# Patient Record
Sex: MALE | Race: WHITE | NOT HISPANIC OR LATINO | Employment: FULL TIME | URBAN - METROPOLITAN AREA
[De-identification: names, ages, dates, MRNs, and addresses within clinical notes are randomized per-mention and may not be internally consistent; named-entity substitution may affect disease eponyms.]

---

## 2019-07-08 ENCOUNTER — OFFICE VISIT (OUTPATIENT)
Dept: FAMILY MEDICINE | Facility: CLINIC | Age: 39
End: 2019-07-08
Payer: COMMERCIAL

## 2019-07-08 VITALS
TEMPERATURE: 98 F | HEIGHT: 71 IN | DIASTOLIC BLOOD PRESSURE: 72 MMHG | RESPIRATION RATE: 16 BRPM | BODY MASS INDEX: 25.81 KG/M2 | OXYGEN SATURATION: 98 % | SYSTOLIC BLOOD PRESSURE: 114 MMHG | WEIGHT: 184.4 LBS | HEART RATE: 62 BPM

## 2019-07-08 DIAGNOSIS — Z00.00 ROUTINE GENERAL MEDICAL EXAMINATION AT A HEALTH CARE FACILITY: Primary | ICD-10-CM

## 2019-07-08 DIAGNOSIS — J45.20 MILD INTERMITTENT ASTHMA WITHOUT COMPLICATION: ICD-10-CM

## 2019-07-08 DIAGNOSIS — D17.30 LIPOMA OF SKIN AND SUBCUTANEOUS TISSUE: ICD-10-CM

## 2019-07-08 DIAGNOSIS — L72.3 SEBACEOUS CYST: ICD-10-CM

## 2019-07-08 LAB
CHOLEST SERPL-MCNC: 198 MG/DL
HDLC SERPL-MCNC: 51 MG/DL
LDLC SERPL CALC-MCNC: 109 MG/DL
NONHDLC SERPL-MCNC: 147 MG/DL
TRIGL SERPL-MCNC: 191 MG/DL

## 2019-07-08 PROCEDURE — 80061 LIPID PANEL: CPT | Performed by: FAMILY MEDICINE

## 2019-07-08 PROCEDURE — 36415 COLL VENOUS BLD VENIPUNCTURE: CPT | Performed by: FAMILY MEDICINE

## 2019-07-08 PROCEDURE — 87389 HIV-1 AG W/HIV-1&-2 AB AG IA: CPT | Performed by: FAMILY MEDICINE

## 2019-07-08 PROCEDURE — 99395 PREV VISIT EST AGE 18-39: CPT | Performed by: FAMILY MEDICINE

## 2019-07-08 RX ORDER — ALBUTEROL SULFATE 90 UG/1
2 AEROSOL, METERED RESPIRATORY (INHALATION) EVERY 4 HOURS PRN
Qty: 1 INHALER | Refills: 11 | Status: SHIPPED | OUTPATIENT
Start: 2019-07-08

## 2019-07-08 ASSESSMENT — ENCOUNTER SYMPTOMS
PALPITATIONS: 0
NERVOUS/ANXIOUS: 0
COUGH: 0
SORE THROAT: 0
NAUSEA: 0
DIARRHEA: 0
FREQUENCY: 0
WEAKNESS: 0
JOINT SWELLING: 0
SHORTNESS OF BREATH: 0
HEMATOCHEZIA: 0
DIZZINESS: 0
DYSURIA: 0
CONSTIPATION: 0
HEARTBURN: 0
MYALGIAS: 0
CHILLS: 0
ABDOMINAL PAIN: 0
HEMATURIA: 0
HEADACHES: 0
PARESTHESIAS: 0
ARTHRALGIAS: 0
FEVER: 0
EYE PAIN: 0

## 2019-07-08 ASSESSMENT — MIFFLIN-ST. JEOR: SCORE: 1775.62

## 2019-07-08 ASSESSMENT — PAIN SCALES - GENERAL: PAINLEVEL: NO PAIN (0)

## 2019-07-08 NOTE — PATIENT INSTRUCTIONS
Preventive Health Recommendations  Male Ages 26 - 39    Yearly exam:             See your health care provider every year in order to  o   Review health changes.   o   Discuss preventive care.    o   Review your medicines if your doctor has prescribed any.    You should be tested each year for STDs (sexually transmitted diseases), if you re at risk.     After age 35, talk to your provider about cholesterol testing. If you are at risk for heart disease, have your cholesterol tested at least every 5 years.     If you are at risk for diabetes, you should have a diabetes test (fasting glucose).  Shots: Get a flu shot each year. Get a tetanus shot every 10 years.     Nutrition:    Eat at least 5 servings of fruits and vegetables daily.     Eat whole-grain bread, whole-wheat pasta and brown rice instead of white grains and rice.     Get adequate Calcium and Vitamin D.     Lifestyle    Exercise for at least 150 minutes a week (30 minutes a day, 5 days a week). This will help you control your weight and prevent disease.     Limit alcohol to one drink per day.     No smoking.     Wear sunscreen to prevent skin cancer.     See your dentist every six months for an exam and cleaning.      ASSESSMENT/PLAN:                                                    Lifestyle recommendations:regular exercise, at least 150 minutes per week (average 30 minutes 5 times a week)  weight loss recommended (ideal BMI-body mass index is <25)  The following exams/tests were recommended and discussed for health maintenance:  Colon cancer screening beginning at age 50 if at normal risk  Prostate cancer screening is optional starting at age 50 if normal risk, age 40 or 45 for high risk men (strong family history or blacks.)  Screening can include digital rectal exam and PSA blood test.  Cholesterol screening to evaluate cardiovascular risk.  Cholesterol screening every 4-5 years if normal values and low risk     (Z00.00) Routine general medical  examination at a health care facility  (primary encounter diagnosis)  Comment:   Plan: HIV Antigen Antibody Combo, Lipid panel reflex         to direct LDL Non-fasting        Non-fasting blood tests today.     (J45.20) Mild intermittent asthma without complication  Comment: stble with infrequent albuteral inhaler use.   Plan: albuterol (PROAIR HFA) 108 (90 Base) MCG/ACT         inhaler        Refills if needed.   Albuteral inhaler can be used taking 2 inhalations every 4 hours as needed for coughing, wheezing or shortness of breath.  OK to use 15-30 minutes before exercise or activity that could trigger the asthma.     (L72.3) Sebaceous cyst  Comment: lump on scalp is benign-not cancer.  Not worrisome  Plan: It could be removed with clinic office surgical procedure.    Schedule an appointment with me if you would like it removed.     (D17.30) Lipoma of skin and subcutaneous tissue  Comment: fatty lump on thigh is also not cancer or worrisome.    Plan: I recommend leaving it alone unless causing problems.

## 2019-07-08 NOTE — PROGRESS NOTES
SUBJECTIVE:   CC: Temo Sawant is an 39 year old male who presents for preventative health visit.   Chief Complaint   Patient presents with     Physical      Issues:  Lump on top of head.  Onset of symptoms: a couple years.  Unchanged in size.  Irritated from hard hat.   Lump left thigh a couple years.    Moles moustache area.   Skin tag posterior neck.     Would like albuteral inhaler.  Has used a couple times in the past year.  Triggering factors: dust, running.      Healthy Habits:     Getting at least 3 servings of Calcium per day:  Yes    Bi-annual eye exam:  Yes    Dental care twice a year:  Yes    Sleep apnea or symptoms of sleep apnea:  None    Diet:  Regular (no restrictions)    Frequency of exercise:  None    Taking medications regularly:  Yes    Medication side effects:  Other    PHQ-2 Total Score: 0    Additional concerns today:  No  Exercise:none    Today's PHQ-2 Score:   PHQ-2 (  [employer]) 2019   Q1: Little interest or pleasure in doing things 0   Q2: Feeling down, depressed or hopeless 0   PHQ-2 Score 0   Q1: Little interest or pleasure in doing things Not at all   Q2: Feeling down, depressed or hopeless Not at all   PHQ-2 Score 0     Abuse: Current or Past(Physical, Sexual or Emotional)- NO  Do you feel safe in your environment? YES    Social History     Tobacco Use     Smoking status: Former Smoker     Packs/day: 1.00     Years: 2.00     Pack years: 2.00     Last attempt to quit: 2000     Years since quittin.5     Smokeless tobacco: Former User   Substance Use Topics     Alcohol use: Yes     If you drink alcohol do you typically have >3 drinks per day or >7 drinks per week? No    Alcohol Use 2019   Prescreen: >3 drinks/day or >7 drinks/week? No     Last PSA: No results found for: PSA    Patient Active Problem List    Diagnosis Date Noted     Intermittent asthma 2014     Priority: Medium     CARDIOVASCULAR SCREENING; LDL GOAL LESS THAN 160 10/31/2010     Priority: Medium     "  Family history:  CV disease: no  Prostate cancer: no  Colon cancer: no    Multivitamin or Vit D use: no    Vaccines:current     Past Colon cancer screening:na     Review of Systems   Constitutional: Negative for chills and fever.   HENT: Negative for congestion, ear pain, hearing loss and sore throat.    Eyes: Negative for pain and visual disturbance.   Respiratory: Negative for cough and shortness of breath.    Cardiovascular: Negative for chest pain, palpitations and peripheral edema.   Gastrointestinal: Negative for abdominal pain, constipation, diarrhea, heartburn, hematochezia and nausea.   Genitourinary: Negative for discharge, dysuria, frequency, genital sores, hematuria, impotence and urgency.   Musculoskeletal: Negative for arthralgias, joint swelling and myalgias.   Skin: Negative for rash.   Neurological: Negative for dizziness, weakness, headaches and paresthesias.   Psychiatric/Behavioral: Negative for mood changes. The patient is not nervous/anxious.    occasional headaches in the summer.     Occupation: installs elevators.     OBJECTIVE:                                                    OBJECTIVE:Blood pressure 114/72, pulse 62, temperature 98  F (36.7  C), temperature source Tympanic, resp. rate 16, height 1.791 m (5' 10.5\"), weight 83.6 kg (184 lb 6.4 oz), SpO2 98 %. BMI=Body mass index is 26.08 kg/m .  GENERAL APPEARANCE ADULT: Alert, no acute distress  EYES: PERRL, EOM normal, conjunctiva and lids normal  HENT: Ears and TMs normal, oral mucosa and posterior oropharynx normal  NECK: No adenopathy,masses or thyromegaly  RESP: lungs clear to auscultation   CV: normal rate, regular rhythm, no murmur or gallop  ABDOMEN: soft, no organomegaly, masses or tenderness   (male): declined  MS: extremities normal, no peripheral edema  SKIN: 1cm lump top of head.   2cm lipoma anterior right thigh.     ASSESSMENT/PLAN:                                                    Lifestyle recommendations:regular " exercise, at least 150 minutes per week (average 30 minutes 5 times a week)  weight loss recommended (ideal BMI-body mass index is <25)  The following exams/tests were recommended and discussed for health maintenance:  Colon cancer screening beginning at age 50 if at normal risk  Prostate cancer screening is optional starting at age 50 if normal risk, age 40 or 45 for high risk men (strong family history or blacks.)  Screening can include digital rectal exam and PSA blood test.  Cholesterol screening to evaluate cardiovascular risk.  Cholesterol screening every 4-5 years if normal values and low risk     (Z00.00) Routine general medical examination at a health care facility  (primary encounter diagnosis)  Comment:   Plan: HIV Antigen Antibody Combo, Lipid panel reflex         to direct LDL Non-fasting        Non-fasting blood tests today.     (J45.20) Mild intermittent asthma without complication  Comment: stble with infrequent albuteral inhaler use.   Plan: albuterol (PROAIR HFA) 108 (90 Base) MCG/ACT         inhaler        Refills if needed.   Albuteral inhaler can be used taking 2 inhalations every 4 hours as needed for coughing, wheezing or shortness of breath.  OK to use 15-30 minutes before exercise or activity that could trigger the asthma.     (L72.3) Sebaceous cyst  Comment: lump on scalp is benign-not cancer.  Not worrisome  Plan: It could be removed with clinic office surgical procedure.    Schedule an appointment with me if you would like it removed.     (D17.30) Lipoma of skin and subcutaneous tissue  Comment: fatty lump on thigh is also not cancer or worrisome.  Plan: I recommend leaving it alone unless causing problems.      COUNSELING:   Reviewed preventive health counseling, as reflected in patient instructions  Special attention given to:        HIV screeninx in teen years, 1x in adult years, and at intervals if high risk    Estimated body mass index is 26.08 kg/m  as calculated from the  "following:    Height as of this encounter: 1.791 m (5' 10.5\").    Weight as of this encounter: 83.6 kg (184 lb 6.4 oz).     Weight management plan: Discussed healthy diet and exercise guidelines     reports that he quit smoking about 19 years ago. He has a 2.00 pack-year smoking history. He has quit using smokeless tobacco.      Counseling Resources:  ATP IV Guidelines  Pooled Cohorts Equation Calculator  FRAX Risk Assessment  ICSI Preventive Guidelines  Dietary Guidelines for Americans, 2010  USDA's MyPlate  ASA Prophylaxis  Lung CA Screening    Laz Stevenson MD  [facility]  "

## 2019-07-08 NOTE — LETTER
My Asthma Action Plan  Name: Temo HOLMAN Bzdok   YOB: 1980  Date: 7/8/2019   My doctor: Laz Stevenson MD   My clinic: [facility]        My Control Medicine: None  My Rescue Medicine: Albuterol (Proair/Ventolin/Proventil) inhaler 2 ouffs every 4-6 hours as needed forcough and wheeze   My Asthma Severity: intermittent  Avoid your asthma triggers: dust mites and wool               GREEN ZONE   Good Control    I feel good    No cough or wheeze    Can work, sleep and play without asthma symptoms       Take your asthma control medicine every day.     1. If exercise triggers your asthma, take your rescue medication    15 minutes before exercise or sports, and    During exercise if you have asthma symptoms  2. Spacer to use with inhaler: If you have a spacer, make sure to use it with your inhaler             YELLOW ZONE Getting Worse  I have ANY of these:    I do not feel good    Cough or wheeze    Chest feels tight    Wake up at night   1. Keep taking your Green Zone medications  2. Start taking your rescue medicine:    every 20 minutes for up to 1 hour. Then every 4 hours for 24-48 hours.  3. If you stay in the Yellow Zone for more than 12-24 hours, contact your doctor.  4. If you do not return to the Green Zone in 12-24 hours or you get worse, start taking your oral steroid medicine if prescribed by your provider.           RED ZONE Medical Alert - Get Help  I have ANY of these:    I feel awful    Medicine is not helping    Breathing getting harder    Trouble walking or talking    Nose opens wide to breathe       1. Take your rescue medicine NOW  2. If your provider has prescribed an oral steroid medicine, start taking it NOW  3. Call your doctor NOW  4. If you are still in the Red Zone after 20 minutes and you have not reached your doctor:    Take your rescue medicine again and    Call 911 or go to the emergency room right away    See your regular doctor within 2 weeks of an Emergency  Room or Urgent Care visit for follow-up treatment.          Annual Reminders:  Meet with Asthma Educator,  Flu Shot in the Fall, consider Pneumonia Vaccination for patients with asthma (aged 19 and older).    Pharmacy:    [id number] [place] [facility] - [place], [place] - [address]  [facility] - [place], [place] - [address]  [id number] [place] [facility] - [place], [place] - [address]   [id number] - [place], [place] - [address]                      Asthma Triggers  How To Control Things That Make Your Asthma Worse    Triggers are things that make your asthma worse.  Look at the list below to help you find your triggers and what you can do about them.  You can help prevent asthma flare-ups by staying away from your triggers.      Trigger                                                          What you can do   Cigarette Smoke  Tobacco smoke can make asthma worse. Do not allow smoking in your home, car or around you.  Be sure no one smokes at a child s day care or school.  If you smoke, ask your health care provider for ways to help you quit.  Ask family members to quit too.  Ask your health care provider for a referral to Quit Plan to help you quit smoking, or call 2-345-401-PLAN.     Colds, Flu, Bronchitis  These are common triggers of asthma. Wash your hands often.  Don t touch your eyes, nose or mouth.  Get a flu shot every year.     Dust Mites  These are tiny bugs that live in cloth or carpet. They are too small to see. Wash sheets and blankets in hot water every week.   Encase pillows and mattress in dust mite proof covers.  Avoid having carpet if you can. If you have carpet, vacuum weekly.   Use a dust mask and HEPA vacuum.   Pollen and Outdoor Mold  Some people are allergic to trees, grass, or weed pollen, or molds. Try to keep your windows closed.  Limit time out doors when pollen count is high.   Ask you health care provider about taking medicine during allergy season.     Animal  Dander  Some people are allergic to skin flakes, urine or saliva from pets with fur or feathers. Keep pets with fur or feathers out of your home.    If you can t keep the pet outdoors, then keep the pet out of your bedroom.  Keep the bedroom door closed.  Keep pets off cloth furniture and away from stuffed toys.     Mice, Rats, and Cockroaches  Some people are allergic to the waste from these pests.   Cover food and garbage.  Clean up spills and food crumbs.  Store grease in the refrigerator.   Keep food out of the bedroom.   Indoor Mold  This can be a trigger if your home has high moisture. Fix leaking faucets, pipes, or other sources of water.   Clean moldy surfaces.  Dehumidify basement if it is damp and smelly.   Smoke, Strong Odors, and Sprays  These can reduce air quality. Stay away from strong odors and sprays, such as perfume, powder, hair spray, paints, smoke incense, paint, cleaning products, candles and new carpet.   Exercise or Sports  Some people with asthma have this trigger. Be active!  Ask your doctor about taking medicine before sports or exercise to prevent symptoms.    Warm up for 5-10 minutes before and after sports or exercise.     Other Triggers of Asthma  Cold air:  Cover your nose and mouth with a scarf.  Sometimes laughing or crying can be a trigger.  Some medicines and food can trigger asthma.

## 2019-07-08 NOTE — NURSING NOTE
"Chief Complaint   Patient presents with     Physical       Initial /72 (BP Location: Right arm, Patient Position: Chair, Cuff Size: Adult Large)   Pulse 62   Temp 98  F (36.7  C) (Tympanic)   Resp 16   Ht 1.791 m (5' 10.5\")   Wt 83.6 kg (184 lb 6.4 oz)   SpO2 98%   BMI 26.08 kg/m   Estimated body mass index is 26.08 kg/m  as calculated from the following:    Height as of this encounter: 1.791 m (5' 10.5\").    Weight as of this encounter: 83.6 kg (184 lb 6.4 oz).    Patient presents to the clinic using No DME    Health Maintenance that is potentially due pending provider review:  ACT    Possibly completing today per provider review.    Is there anyone who you would like to be able to receive your results? Yes- wife Ruby  If yes have patient fill out YANA  Micki Quezada CMA    "

## 2019-07-09 LAB — HIV 1+2 AB+HIV1 P24 AG SERPL QL IA: NONREACTIVE

## 2019-07-09 ASSESSMENT — ASTHMA QUESTIONNAIRES: ACT_TOTALSCORE: 25

## 2019-07-10 NOTE — RESULT ENCOUNTER NOTE
Please call.  HIV test for AIDS virus is negative..   LDL and triglycerides are elevated.   PLAN: Weight loss, regular exercise with goal of 30 minutes most days of the week and eating a prudent diet (lots of fruits and vegetables, limiting unhealthy fats and excessive calories) can help improve both.

## 2019-11-27 ENCOUNTER — TELEPHONE (OUTPATIENT)
Dept: FAMILY MEDICINE | Facility: CLINIC | Age: 39
End: 2019-11-27

## 2019-11-28 NOTE — TELEPHONE ENCOUNTER
Reason for Call:  Other appointment    Detailed comments: Wife of patient called to schedule a removal of cysts. Patient was seen on 7/8/19 and it was discussed that they could schedule the removal with Dr. Stevenson.     I have scheduled the appointment for 40 minutes, as I was unsure how much time it would take and wife of patient also wanted to be sure that it is enough time.    Phone Number Patient can be reached at: [phone]    Best Time: Any time.    Can we leave a detailed message on this number? YES    Call taken on 11/27/2019 at 7:18 PM by Chinmay Briscoe

## 2019-11-29 NOTE — TELEPHONE ENCOUNTER
Please call.  A 40 minute appointment should be OK for the two lumps on head and thigh.   BOO ARITA MD

## 2019-12-23 ENCOUNTER — OFFICE VISIT (OUTPATIENT)
Dept: FAMILY MEDICINE | Facility: CLINIC | Age: 39
End: 2019-12-23
Payer: COMMERCIAL

## 2019-12-23 VITALS
RESPIRATION RATE: 16 BRPM | DIASTOLIC BLOOD PRESSURE: 72 MMHG | TEMPERATURE: 97.5 F | SYSTOLIC BLOOD PRESSURE: 120 MMHG | WEIGHT: 191 LBS | HEIGHT: 71 IN | BODY MASS INDEX: 26.74 KG/M2 | HEART RATE: 62 BPM

## 2019-12-23 DIAGNOSIS — D17.30 LIPOMA OF SKIN AND SUBCUTANEOUS TISSUE: ICD-10-CM

## 2019-12-23 DIAGNOSIS — L72.3 SEBACEOUS CYST: Primary | ICD-10-CM

## 2019-12-23 PROCEDURE — 11422 EXC H-F-NK-SP B9+MARG 1.1-2: CPT | Performed by: FAMILY MEDICINE

## 2019-12-23 PROCEDURE — 99212 OFFICE O/P EST SF 10 MIN: CPT | Mod: 25 | Performed by: FAMILY MEDICINE

## 2019-12-23 ASSESSMENT — MIFFLIN-ST. JEOR: SCORE: 1805.56

## 2019-12-23 NOTE — NURSING NOTE
"No chief complaint on file.      Initial /72   Pulse 62   Temp 97.5  F (36.4  C) (Tympanic)   Resp 16   Ht 1.791 m (5' 10.5\")   Wt 86.6 kg (191 lb)   BMI 27.02 kg/m   Estimated body mass index is 27.02 kg/m  as calculated from the following:    Height as of this encounter: 1.791 m (5' 10.5\").    Weight as of this encounter: 86.6 kg (191 lb).    Patient presents to the clinic using     Health Maintenance that is potentially due pending provider review:      ACT done and declines flu shot    Is there anyone who you would like to be able to receive your results?   If yes have patient fill out YANA      "

## 2019-12-23 NOTE — PROGRESS NOTES
"SUBJECTIVE: Temo Sawant is a 39 year old male  No chief complaint on file.     Lump on head and upper left thigh      Duration: years for head and thigh    Description (location/character/radiation): raised area    Intensity:  Pressure at times for head lump    Accompanying signs and symptoms: none    History (similar episodes/previous evaluation): yes with head    Precipitating or alleviating factors: None    Therapies tried and outcome: removed from head in the past.     REcent physical exam 7/8/2019  He has lump on scalp and left thigh he wanted removed.    He has had sebaceous cyst on scalp in the past.     Patient Active Problem List   Diagnosis     CARDIOVASCULAR SCREENING; LDL GOAL LESS THAN 160     Intermittent asthma      OBJECTIVE:Blood pressure 120/72, pulse 62, temperature 97.5  F (36.4  C), temperature source Tympanic, resp. rate 16, height 1.791 m (5' 10.5\"), weight 86.6 kg (191 lb). BMI=Body mass index is 27.02 kg/m .  GENERAL APPEARANCE ADULT: Alert, no acute distress  SKIN: HE has a 12mm lump top of scalp which looks like sebaceous cyst.   1cm lump left anterior thigh feels like lipoma.     ASSESSMENT:   (L72.3) Sebaceous cyst  (primary encounter diagnosis)  Comment:   Plan: EXC BENIGN SKIN LESION SCLP/NCK/HNDS/FEET/GEN         1.1-2.0 CM        Informed consent was obtained.    Skin cleansing with povodine iodine solution     1% Lidocaine with epinephrine for local anesthetic.     With sterile technique, cyst puncture and removal was performed.   Wound closure: 1, 4-0 nylon sutures inserted.   Dressing not needed    Wound care instructions:    OK for showers and shampoo hair.    Recheck with any signs of wound infection or problems like redness, tenderness or a lot of swelling.  The procedure was well tolerated without complications.  Follow up: No pathology specimen is sent today. , Suture(s) may be removed at home in 7 days if wound is OK and you feel comfortable removing them.   Or, can see " clinic RN for suture removal    (D17.30) Lipoma of skin and subcutaneous tissue  Comment: This is a small benign appearing lipoma.  I recommended leaving it alone.   Plan: It could be removed if enlarging or causing symptoms in the future.

## 2019-12-24 ASSESSMENT — ASTHMA QUESTIONNAIRES: ACT_TOTALSCORE: 25

## 2023-05-02 ENCOUNTER — OFFICE VISIT (OUTPATIENT)
Dept: FAMILY MEDICINE | Facility: CLINIC | Age: 43
End: 2023-05-02
Payer: COMMERCIAL

## 2023-05-02 VITALS
HEART RATE: 61 BPM | WEIGHT: 190 LBS | DIASTOLIC BLOOD PRESSURE: 70 MMHG | RESPIRATION RATE: 18 BRPM | OXYGEN SATURATION: 97 % | HEIGHT: 71 IN | BODY MASS INDEX: 26.6 KG/M2 | TEMPERATURE: 97.7 F | SYSTOLIC BLOOD PRESSURE: 116 MMHG

## 2023-05-02 DIAGNOSIS — D22.9 ATYPICAL NEVI: ICD-10-CM

## 2023-05-02 DIAGNOSIS — L72.9 SCALP CYST: Primary | ICD-10-CM

## 2023-05-02 PROCEDURE — 99203 OFFICE O/P NEW LOW 30 MIN: CPT | Performed by: FAMILY MEDICINE

## 2023-05-02 ASSESSMENT — ASTHMA QUESTIONNAIRES
QUESTION_4 LAST FOUR WEEKS HOW OFTEN HAVE YOU USED YOUR RESCUE INHALER OR NEBULIZER MEDICATION (SUCH AS ALBUTEROL): NOT AT ALL
QUESTION_2 LAST FOUR WEEKS HOW OFTEN HAVE YOU HAD SHORTNESS OF BREATH: NOT AT ALL
ACT_TOTALSCORE: 25
QUESTION_5 LAST FOUR WEEKS HOW WOULD YOU RATE YOUR ASTHMA CONTROL: COMPLETELY CONTROLLED
ACT_TOTALSCORE: 25
QUESTION_3 LAST FOUR WEEKS HOW OFTEN DID YOUR ASTHMA SYMPTOMS (WHEEZING, COUGHING, SHORTNESS OF BREATH, CHEST TIGHTNESS OR PAIN) WAKE YOU UP AT NIGHT OR EARLIER THAN USUAL IN THE MORNING: NOT AT ALL
QUESTION_1 LAST FOUR WEEKS HOW MUCH OF THE TIME DID YOUR ASTHMA KEEP YOU FROM GETTING AS MUCH DONE AT WORK, SCHOOL OR AT HOME: NONE OF THE TIME

## 2023-05-02 ASSESSMENT — PAIN SCALES - GENERAL: PAINLEVEL: NO PAIN (0)

## 2023-05-02 NOTE — PROGRESS NOTES
"  Assessment & Plan     Scalp cyst  Involving frontal scalp, likely related to epidermal inclusion cyst, dermatology referral placed for excision  - Adult Dermatology Referral; Future    Atypical nevi  Multiple atypical nevi involving upper back.  Recommended to use sunscreen regularly and dermatology referral placed      Dionicio Daniels MD  [facility]    Kat Plascencia is a 43 year old, presenting for the following health issues:  Derm Problem      History of Present Illness       Reason for visit:  Bump on head and dark moles  Symptoms include:  One cyst on the top of the head- dark spots on moles- 2 in his mustach and one in the left eyebrow   Symptom intensity:  Mild  Prior treatment description:  Has had 2 subaceous cysts removed in the past.     He eats 0-1 servings of fruits and vegetables daily.He consumes 2 sweetened beverage(s) daily.He exercises with enough effort to increase his heart rate 9 or less minutes per day.  He exercises with enough effort to increase his heart rate 3 or less days per week.   He is taking medications regularly.         Review of Systems   Constitutional, HEENT, cardiovascular, pulmonary, gi and gu systems are negative, except as otherwise noted.      Objective    /70 (Cuff Size: Adult Large)   Pulse 61   Temp 97.7  F (36.5  C) (Tympanic)   Resp 18   Ht 1.791 m (5' 10.5\")   Wt 86.2 kg (190 lb)   SpO2 97%   BMI 26.88 kg/m    Body mass index is 26.88 kg/m .  Physical Exam   GENERAL: alert and no distress  SKIN: small frontal scalp cyst, nontender, multiple hyperpigmented nevi involving upper back as shown below  NEURO: normal strength and tone, mentation intact and speech normal  PSYCH: mentation appears normal, affect normal/bright                                        "

## 2023-05-02 NOTE — NURSING NOTE
"Chief Complaint   Patient presents with     Derm Problem     /70 (Cuff Size: Adult Large)   Pulse 61   Temp 97.7  F (36.5  C) (Tympanic)   Resp 18   Ht 1.791 m (5' 10.5\")   Wt 86.2 kg (190 lb)   SpO2 97%   BMI 26.88 kg/m   Estimated body mass index is 26.88 kg/m  as calculated from the following:    Height as of this encounter: 1.791 m (5' 10.5\").    Weight as of this encounter: 86.2 kg (190 lb).  Patient presents to the clinic using No DME      Health Maintenance that is potentially due pending provider review:    Health Maintenance Due   Topic Date Due     ANNUAL REVIEW OF HM ORDERS  Never done     ADVANCE CARE PLANNING  Never done     HEPATITIS B IMMUNIZATION (1 of 3 - 3-dose series) Never done     COVID-19 Vaccine (1) Never done     HEPATITIS C SCREENING  Never done     YEARLY PREVENTIVE VISIT  07/08/2020     ASTHMA ACTION PLAN  07/08/2020     INFLUENZA VACCINE (1) 09/01/2022                "

## 2024-02-23 ENCOUNTER — TELEPHONE (OUTPATIENT)
Dept: FAMILY MEDICINE | Facility: CLINIC | Age: 44
End: 2024-02-23
Payer: COMMERCIAL

## 2024-02-23 DIAGNOSIS — L72.9 SCALP CYST: Primary | ICD-10-CM

## 2024-02-23 DIAGNOSIS — D22.9 ATYPICAL NEVI: ICD-10-CM

## 2024-02-23 NOTE — TELEPHONE ENCOUNTER
Order/Referral Request    Who is requesting: spouse    Orders being requested: dermatology    Reason service is needed/diagnosis: for some bumps he has     When are orders needed by: prior to his scheduled appointment    Has this been discussed with Provider: Yes    Does patient have a preference on a Group/Provider/Facility? [facility]     Does patient have an appointment scheduled?: Yes: 8-1-24    Where to send orders: Place orders within Epic    Okay to leave a detailed message?: Yes at Other phone number:  Ruby spouse [phone]

## 2024-02-23 NOTE — TELEPHONE ENCOUNTER
Spoke with spouse Ruby (C2C on file) who states patient was seen on 23 for scalp cyst and atypical nevvi, referral to derm was given but patient did not schedule his appointment right away.     Appointment with Joya Cuellar in [facility] is scheduled for 24 but his referral will be . Requesting new referral be placed.    Referral pended and message routed to patient for consideration.     Julie Behrendt RN

## 2024-02-26 ENCOUNTER — TELEPHONE (OUTPATIENT)
Dept: DERMATOLOGY | Facility: CLINIC | Age: 44
End: 2024-02-26
Payer: COMMERCIAL

## 2024-02-26 NOTE — TELEPHONE ENCOUNTER
Patient Contact    Attempt # 1    Was call answered?  No    Called patient. No answer. Left message to call back. Clinic number was provided.     Noemy Perea RN    [facility]   [phone]

## 2024-02-26 NOTE — TELEPHONE ENCOUNTER
This encounter is being sent to inform the clinic that this patient has a referral from Dr. Dionicio Daniels for the diagnoses of Scalp cyst, Atypical nevi and has requested that this patient be seen within 1-2 weeks. Based on the availability of our provider(s), we are unable to accommodate this request.    Were all sites offered this patient?  Patient already scheduled in [place].    Does scheduling algorithm request to schedule next available?  Patient has been scheduled for the first available opening with Joya Cuellar on 8/6/2024. The patient is unavailable between April 22 and May 3.  We have informed the patient that the clinic will review their referral and reach out if a sooner appointment is medically necessary.

## 2024-02-27 NOTE — TELEPHONE ENCOUNTER
Patient Contact    Attempt # 2    Was call answered?  No    Called patient. No answer. Left message to call back. Clinic number was provided.     Noemy Perea RN    [facility]   [phone]

## 2024-02-28 NOTE — TELEPHONE ENCOUNTER
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     Noemy Perea RN    "

## 2024-04-09 ENCOUNTER — OFFICE VISIT (OUTPATIENT)
Dept: DERMATOLOGY | Facility: CLINIC | Age: 44
End: 2024-04-09
Attending: FAMILY MEDICINE
Payer: COMMERCIAL

## 2024-04-09 DIAGNOSIS — L81.4 LENTIGO: Primary | ICD-10-CM

## 2024-04-09 DIAGNOSIS — D22.9 MULTIPLE BENIGN NEVI: ICD-10-CM

## 2024-04-09 DIAGNOSIS — D18.01 CHERRY ANGIOMA: ICD-10-CM

## 2024-04-09 DIAGNOSIS — D22.9 ATYPICAL NEVI: ICD-10-CM

## 2024-04-09 DIAGNOSIS — L72.9 SCALP CYST: ICD-10-CM

## 2024-04-09 PROCEDURE — 99203 OFFICE O/P NEW LOW 30 MIN: CPT | Performed by: PHYSICIAN ASSISTANT

## 2024-04-09 ASSESSMENT — PAIN SCALES - GENERAL: PAINLEVEL: NO PAIN (0)

## 2024-04-09 NOTE — PROGRESS NOTES
Temo Sawant is an extremely pleasant 44 year old year old male patient here today for cyst and moles. He notes cyst on scalp becoming larger, bothersome and has a similar spot on low back. He denies any bleeding skin lesions. He has had a few cyst removed in the past. He is leaving for [place] in less then 2 weeks and will be gone 10-11 days. Patient has no other skin complaints today.  Remainder of the HPI, Meds, PMH, Allergies, FH, and SH was reviewed in chart.    Pertinent Hx:   No personal history of skin cancer.   No past medical history on file.    Past Surgical History:   Procedure Laterality Date    VASECTOMY  2010        Family History   Problem Relation Age of Onset    Asthma Brother     Asthma Brother     Diabetes Mother     Diabetes Father     Cerebrovascular Disease Father     Vascular Disease Father     Allergies Son     Asthma Son     Allergies Daughter     Coronary Artery Disease No family hx of     Prostate Cancer No family hx of     Colon Cancer No family hx of        Social History     Socioeconomic History    Marital status:      Spouse name: Not on file    Number of children: Not on file    Years of education: Not on file    Highest education level: Not on file   Occupational History    Not on file   Tobacco Use    Smoking status: Former     Packs/day: 1.00     Years: 2.00     Additional pack years: 0.00     Total pack years: 2.00     Types: Cigarettes     Quit date: 2000     Years since quittin.2     Passive exposure: Past    Smokeless tobacco: Former   Vaping Use    Vaping Use: Never used   Substance and Sexual Activity    Alcohol use: Yes    Drug use: No    Sexual activity: Yes     Partners: Female     Birth control/protection: Surgical, Male Surgical     Comment: vas   Other Topics Concern    Parent/sibling w/ CABG, MI or angioplasty before 65F 55M? Not Asked     Service Yes    Blood Transfusions No    Caffeine Concern No    Occupational Exposure No    Hobby  Hazards No    Sleep Concern No    Stress Concern No    Weight Concern No    Special Diet No    Back Care No     Comment: Herniated discs  L5  and ?    Exercise Yes     Comment: Mod has small children    Bike Helmet Not Asked    Seat Belt Yes    Self-Exams Yes   Social History Narrative    Not on file     Social Determinants of Health     Financial Resource Strain: Not on file   Food Insecurity: Not on file   Transportation Needs: Not on file   Physical Activity: Not on file   Stress: Not on file   Social Connections: Not on file   Interpersonal Safety: Not on file   Housing Stability: Not on file       Outpatient Encounter Medications as of 4/9/2024   Medication Sig Dispense Refill    albuterol (2.5 MG/3ML) 0.083% nebulizer solution 1 vial (2.5 mg) every 6 hours as needed for shortness of breath / dyspnea (Patient not taking: Reported on 5/2/2023) 30 vial 0    albuterol (PROAIR HFA) 108 (90 Base) MCG/ACT inhaler Inhale 2 puffs into the lungs every 4 hours as needed for shortness of breath / dyspnea or wheezing (Patient not taking: Reported on 5/2/2023) 1 Inhaler 11     No facility-administered encounter medications on file as of 4/9/2024.             O:   NAD, WDWN, Alert & Oriented, Mood & Affect wnl, Vitals stable   Here today alone   There were no vitals taken for this visit.   General appearance normal   Vitals stable   Alert, oriented and in no acute distress         brown macules on upper trunk and ext   Red papules on trunk  Brown irregular macule on right flank   Brown papules and macules with regular pigment network and borders on torso and extremities   Skin colored nodule on frontal scalp and pink small nodule on low back    The remainder of skin exam is normal.      Eyes: Conjunctivae/lids:Normal     ENT: Lips normal    MSK:Normal    Pulm: Breathing Normal    Neuro/Psych: Orientation:Alert and Orientedx3 ; Mood/Affect:normal     A/P:  1. Pilar cyst on scalp  8 mm, will do 6 mm punch biopsy for  removal.  2. Skin nodule on low back   Cyst vs dermatofibroma   Will do punch biopsy for removal.   3. Atypical nevus on right flank   Recommend biopsy. He is leave in [place] in less than 2 weeks.   4. lentigo, angioma, benign nevi   It was a pleasure speaking to Temo Sawant today.  BENIGN LESIONS DISCUSSED WITH PATIENT:  I discussed the specifics of tumor, prognosis, and genetics of benign lesions.  I explained that treatment of these lesions would be purely cosmetic and not medically neccessary.  I discussed with patient different removal options including excision, cautery and /or laser.      Nature and genetics of benign skin lesions dicussed with patient.  Signs and Symptoms of skin cancer discussed with patient.  ABCDEs of melanoma reviewed with patient.  Patient encouraged to perform monthly skin exams.  UV precautions reviewed with patient.  Risks of non-melanoma skin cancer discussed with patient   Return to clinic for punch biopsy and shave biopsies in May.

## 2024-04-09 NOTE — LETTER
2024         RE: Temo Sawant  [address] [place] [address]        Dear Colleague,    Thank you for referring your patient, Temo Sawant, to the [facility]. Please see a copy of my visit note below.    Temo Sawant is an extremely pleasant 44 year old year old male patient here today for cyst and moles. He notes cyst on scalp becoming larger, bothersome and has a similar spot on low back. He denies any bleeding skin lesions. He has had a few cyst removed in the past. He is leaving for [place] in less then 2 weeks and will be gone 10-11 days. Patient has no other skin complaints today.  Remainder of the HPI, Meds, PMH, Allergies, FH, and SH was reviewed in chart.    Pertinent Hx:   No personal history of skin cancer.   No past medical history on file.    Past Surgical History:   Procedure Laterality Date     VASECTOMY  2010        Family History   Problem Relation Age of Onset     Asthma Brother      Asthma Brother      Diabetes Mother      Diabetes Father      Cerebrovascular Disease Father      Vascular Disease Father      Allergies Son      Asthma Son      Allergies Daughter      Coronary Artery Disease No family hx of      Prostate Cancer No family hx of      Colon Cancer No family hx of        Social History     Socioeconomic History     Marital status:      Spouse name: Not on file     Number of children: Not on file     Years of education: Not on file     Highest education level: Not on file   Occupational History     Not on file   Tobacco Use     Smoking status: Former     Packs/day: 1.00     Years: 2.00     Additional pack years: 0.00     Total pack years: 2.00     Types: Cigarettes     Quit date: 2000     Years since quittin.2     Passive exposure: Past     Smokeless tobacco: Former   Vaping Use     Vaping Use: Never used   Substance and Sexual Activity     Alcohol use: Yes     Drug use: No     Sexual activity: Yes     Partners: Female      Birth control/protection: Surgical, Male Surgical     Comment: vas   Other Topics Concern     Parent/sibling w/ CABG, MI or angioplasty before 65F 55M? Not Asked      Service Yes     Blood Transfusions No     Caffeine Concern No     Occupational Exposure No     Hobby Hazards No     Sleep Concern No     Stress Concern No     Weight Concern No     Special Diet No     Back Care No     Comment: Herniated discs  L5  and ?     Exercise Yes     Comment: Mod has small children     Bike Helmet Not Asked     Seat Belt Yes     Self-Exams Yes   Social History Narrative     Not on file     Social Determinants of Health     Financial Resource Strain: Not on file   Food Insecurity: Not on file   Transportation Needs: Not on file   Physical Activity: Not on file   Stress: Not on file   Social Connections: Not on file   Interpersonal Safety: Not on file   Housing Stability: Not on file       Outpatient Encounter Medications as of 4/9/2024   Medication Sig Dispense Refill     albuterol (2.5 MG/3ML) 0.083% nebulizer solution 1 vial (2.5 mg) every 6 hours as needed for shortness of breath / dyspnea (Patient not taking: Reported on 5/2/2023) 30 vial 0     albuterol (PROAIR HFA) 108 (90 Base) MCG/ACT inhaler Inhale 2 puffs into the lungs every 4 hours as needed for shortness of breath / dyspnea or wheezing (Patient not taking: Reported on 5/2/2023) 1 Inhaler 11     No facility-administered encounter medications on file as of 4/9/2024.             O:   NAD, WDWN, Alert & Oriented, Mood & Affect wnl, Vitals stable   Here today alone   There were no vitals taken for this visit.   General appearance normal   Vitals stable   Alert, oriented and in no acute distress         brown macules on upper trunk and ext   Red papules on trunk  Brown irregular macule on right flank   Brown papules and macules with regular pigment network and borders on torso and extremities   Skin colored nodule on frontal scalp and pink small nodule on low back     The remainder of skin exam is normal.      Eyes: Conjunctivae/lids:Normal     ENT: Lips normal    MSK:Normal    Pulm: Breathing Normal    Neuro/Psych: Orientation:Alert and Orientedx3 ; Mood/Affect:normal     A/P:  1. Pilar cyst on scalp  8 mm, will do 6 mm punch biopsy for removal.  2. Skin nodule on low back   Cyst vs dermatofibroma   Will do punch biopsy for removal.   3. Atypical nevus on right flank   Recommend biopsy. He is leave in [place] in less than 2 weeks.   4. lentigo, angioma, benign nevi   It was a pleasure speaking to Temo Sawant today.  BENIGN LESIONS DISCUSSED WITH PATIENT:  I discussed the specifics of tumor, prognosis, and genetics of benign lesions.  I explained that treatment of these lesions would be purely cosmetic and not medically neccessary.  I discussed with patient different removal options including excision, cautery and /or laser.      Nature and genetics of benign skin lesions dicussed with patient.  Signs and Symptoms of skin cancer discussed with patient.  ABCDEs of melanoma reviewed with patient.  Patient encouraged to perform monthly skin exams.  UV precautions reviewed with patient.  Risks of non-melanoma skin cancer discussed with patient   Return to clinic for punch biopsy and shave biopsies in May.       Again, thank you for allowing me to participate in the care of your patient.        Sincerely,        Joya Tomlin PA-C

## 2024-04-09 NOTE — NURSING NOTE
Chief Complaint   Patient presents with    Skin Check     Moles on back, and upper lip and cyst on scalp        There were no vitals filed for this visit.  Wt Readings from Last 1 Encounters:   05/02/23 86.2 kg (190 lb)       Betty Gandara LPN .................4/9/2024

## 2024-05-17 ENCOUNTER — OFFICE VISIT (OUTPATIENT)
Dept: DERMATOLOGY | Facility: CLINIC | Age: 44
End: 2024-05-17
Payer: COMMERCIAL

## 2024-05-17 DIAGNOSIS — D48.5 NEOPLASM OF UNCERTAIN BEHAVIOR OF SKIN: ICD-10-CM

## 2024-05-17 DIAGNOSIS — D48.5 NEOPLASM OF UNCERTAIN BEHAVIOR OF SCALP: ICD-10-CM

## 2024-05-17 PROCEDURE — 11105 PUNCH BX SKIN EA SEP/ADDL: CPT | Performed by: PHYSICIAN ASSISTANT

## 2024-05-17 PROCEDURE — 88305 TISSUE EXAM BY PATHOLOGIST: CPT | Performed by: PATHOLOGY

## 2024-05-17 PROCEDURE — 11103 TANGNTL BX SKIN EA SEP/ADDL: CPT | Performed by: PHYSICIAN ASSISTANT

## 2024-05-17 PROCEDURE — 88341 IMHCHEM/IMCYTCHM EA ADD ANTB: CPT | Performed by: PATHOLOGY

## 2024-05-17 PROCEDURE — 88342 IMHCHEM/IMCYTCHM 1ST ANTB: CPT | Performed by: PATHOLOGY

## 2024-05-17 PROCEDURE — 88304 TISSUE EXAM BY PATHOLOGIST: CPT | Performed by: PATHOLOGY

## 2024-05-17 PROCEDURE — 11104 PUNCH BX SKIN SINGLE LESION: CPT | Performed by: PHYSICIAN ASSISTANT

## 2024-05-17 ASSESSMENT — PAIN SCALES - GENERAL: PAINLEVEL: NO PAIN (0)

## 2024-05-17 NOTE — PATIENT INSTRUCTIONS
- 5 stitches to be removed in 14 days-  -2 stitches on back and 3 stitches on scalp-     Wound Care Instructions   FOR SUPERFICIAL WOUNDS     [facility] [phone]  [facility] [phone]    Scalp, Neck, Back & Right Side                 AFTER 24 HOURS YOU SHOULD REMOVE THE BANDAGE AND BEGIN DAILY DRESSING CHANGES AS FOLLOWS:     1) Remove Dressing.     2) Clean and dry the area with tap water using a Q-tip or sterile gauze pad.     3) Apply Vaseline, Aquaphor, Polysporin ointment or Bacitracin ointment over entire wound.  Do NOT use Neosporin ointment.     4) Cover the wound with a band-aid, or a sterile non-stick gauze pad and micropore paper tape      REPEAT THESE INSTRUCTIONS AT LEAST ONCE A DAY UNTIL THE WOUND HAS COMPLETELY HEALED.    It is an old wives tale that a wound heals better when it is exposed to air and allowed to dry out. The wound will heal faster with a better cosmetic result if it is kept moist with ointment and covered with a bandage.    **Do not let the wound dry out.**    Supplies Needed:      *Cotton tipped applicators (Q-tips)    *Polysporin Ointment or Bacitracin Ointment (NOT NEOSPORIN)    *Band-aids or non-stick gauze pads and micropore paper tape.      PATIENT INFORMATION:    During the healing process you will notice a number of changes. All wounds develop a small halo of redness surrounding the wound.  This means healing is occurring. Severe itching with extensive redness usually indicates sensitivity to the ointment or bandage tape used to dress the wound.  You should call our office if this develops.      Swelling  and/or discoloration around your surgical site is common, particularly when performed around the eye.    All wounds normally drain.  The larger the wound the more drainage there will be.  After 7-10 days, you will notice the wound beginning to shrink and new skin will begin to grow.  The wound is healed when you can see skin has formed  over the entire area.  A healed wound has a healthy, shiny look to the surface and is red to dark pink in color to normalize.  Wounds may take approximately 4-6 weeks to heal.  Larger wounds may take 6-8 weeks.  After the wound is healed you may discontinue dressing changes.    You may experience a sensation of tightness as your wound heals. This is normal and will gradually subside.    Your healed wound may be sensitive to temperature changes. This sensitivity improves with time, but if you re having a lot of discomfort, try to avoid temperature extremes.    Patients frequently experience itching after their wound appears to have healed because of the continue healing under the skin.  Plain Vaseline will help relieve the itching.      POSSIBLE COMPLICATIONS    BLEEDING:    Leave the bandage in place.  Use tightly rolled up gauze or a cloth to apply direct pressure over the bandage for 30  minutes.  Reapply pressure for an additional 30 minutes if necessary  Use additional gauze and tape to maintain pressure once the bleeding has stopped.

## 2024-05-17 NOTE — LETTER
2024         RE: Temo Sawant  [address]  [place] [address]        Dear Colleague,    Thank you for referring your patient, Temo Sawant, to the [facility]. Please see a copy of my visit note below.    Temo Sawant is an extremely pleasant 44 year old year old male patient here today for cyst and moles removal.  He notes cyst on scalp becoming larger, bothersome and has a similar spot on low back. He also notes mole on back of neck, rubbing. Irregular mole on right flank. Patient has no other skin complaints today.  Remainder of the HPI, Meds, PMH, Allergies, FH, and SH was reviewed in chart.    Pertinent Hx:   No personal history of skin cancer.   No past medical history on file.    Past Surgical History:   Procedure Laterality Date     VASECTOMY  2010        Family History   Problem Relation Age of Onset     Asthma Brother      Asthma Brother      Diabetes Mother      Diabetes Father      Cerebrovascular Disease Father      Vascular Disease Father      Allergies Son      Asthma Son      Allergies Daughter      Coronary Artery Disease No family hx of      Prostate Cancer No family hx of      Colon Cancer No family hx of        Social History     Socioeconomic History     Marital status:      Spouse name: Not on file     Number of children: Not on file     Years of education: Not on file     Highest education level: Not on file   Occupational History     Not on file   Tobacco Use     Smoking status: Former     Current packs/day: 0.00     Average packs/day: 1 pack/day for 2.0 years (2.0 ttl pk-yrs)     Types: Cigarettes     Start date: 1998     Quit date: 2000     Years since quittin.3     Passive exposure: Past     Smokeless tobacco: Former   Vaping Use     Vaping status: Never Used   Substance and Sexual Activity     Alcohol use: Yes     Drug use: No     Sexual activity: Yes     Partners: Female     Birth control/protection: Surgical, Male  Surgical     Comment: vas   Other Topics Concern     Parent/sibling w/ CABG, MI or angioplasty before 65F 55M? Not Asked      Service Yes     Blood Transfusions No     Caffeine Concern No     Occupational Exposure No     Hobby Hazards No     Sleep Concern No     Stress Concern No     Weight Concern No     Special Diet No     Back Care No     Comment: Herniated discs  L5  and ?     Exercise Yes     Comment: Mod has small children     Bike Helmet Not Asked     Seat Belt Yes     Self-Exams Yes   Social History Narrative     Not on file     Social Determinants of Health     Financial Resource Strain: Not on file   Food Insecurity: Not on file   Transportation Needs: Not on file   Physical Activity: Not on file   Stress: Not on file   Social Connections: Not on file   Interpersonal Safety: Not on file   Housing Stability: Not on file       Outpatient Encounter Medications as of 5/17/2024   Medication Sig Dispense Refill     albuterol (2.5 MG/3ML) 0.083% nebulizer solution 1 vial (2.5 mg) every 6 hours as needed for shortness of breath / dyspnea (Patient not taking: Reported on 5/2/2023) 30 vial 0     albuterol (PROAIR HFA) 108 (90 Base) MCG/ACT inhaler Inhale 2 puffs into the lungs every 4 hours as needed for shortness of breath / dyspnea or wheezing (Patient not taking: Reported on 5/2/2023) 1 Inhaler 11     No facility-administered encounter medications on file as of 5/17/2024.             O:   NAD, WDWN, Alert & Oriented, Mood & Affect wnl, Vitals stable   Here today alone   There were no vitals taken for this visit.   General appearance normal   Vitals stable   Alert, oriented and in no acute distress         0.5 cm brown irregular macule on right flank   0.4 cm skin colored papule on right posterior neck   0.8 cm skin colored nodule on mid frontal scalp   0.4 cm pink nodule on mid low back     Eyes: Conjunctivae/lids:Normal     ENT: Lips normal    MSK:Normal    Pulm: Breathing Normal    Neuro/Psych:  Orientation:Alert and Orientedx3 ; Mood/Affect:normal     A/P:  R/O pilar cyst on mid frontal scalp   6 mm punch BIOPSY SENT OUT:  After consent, anesthesia with LEC and prep, punch excision performed, closed with 4-0 Ethilon, 3 simple interrupted sutures used to close wound and specimen sent out for permanent section histology.  No complications and routine wound care. Patient told to call our office in 1-2 weeks for result.      2. R/O epidermal cyst on mid low back  5 mm punch BIOPSY SENT OUT:  After consent, anesthesia with LEC and prep, punch excision performed, closed with 4-0 ehtilon, 2 simple interrupted sutures used to close wound and specimen sent out for permanent section histology.  No complications and routine wound care. Patient told to call our office in 1-2 weeks for result.      3. R/O atypical nevus on right flank  TANGENTIAL BIOPSY SENT OUT:  After consent, anesthesia with LEC and prep, tangential excision performed and specimen sent out for permanent section histology.  No complications and routine wound care. Patient told to call our office in 1-2 weeks for result.      4. R/O intradermal nevus on right posterior neck  TANGENTIAL BIOPSY SENT OUT:  After consent, anesthesia with LEC and prep, tangential excision performed and specimen sent out for permanent section histology.  No complications and routine wound care. Patient told to call our office in 1-2 weeks for result.          Again, thank you for allowing me to participate in the care of your patient.        Sincerely,        Joya Tomlin PA-C

## 2024-05-17 NOTE — PROGRESS NOTES
Temo Sawant is an extremely pleasant 44 year old year old male patient here today for cyst and moles removal.  He notes cyst on scalp becoming larger, bothersome and has a similar spot on low back. He also notes mole on back of neck, rubbing. Irregular mole on right flank. Patient has no other skin complaints today.  Remainder of the HPI, Meds, PMH, Allergies, FH, and SH was reviewed in chart.    Pertinent Hx:   No personal history of skin cancer.   No past medical history on file.    Past Surgical History:   Procedure Laterality Date    VASECTOMY  2010        Family History   Problem Relation Age of Onset    Asthma Brother     Asthma Brother     Diabetes Mother     Diabetes Father     Cerebrovascular Disease Father     Vascular Disease Father     Allergies Son     Asthma Son     Allergies Daughter     Coronary Artery Disease No family hx of     Prostate Cancer No family hx of     Colon Cancer No family hx of        Social History     Socioeconomic History    Marital status:      Spouse name: Not on file    Number of children: Not on file    Years of education: Not on file    Highest education level: Not on file   Occupational History    Not on file   Tobacco Use    Smoking status: Former     Current packs/day: 0.00     Average packs/day: 1 pack/day for 2.0 years (2.0 ttl pk-yrs)     Types: Cigarettes     Start date: 1998     Quit date: 2000     Years since quittin.3     Passive exposure: Past    Smokeless tobacco: Former   Vaping Use    Vaping status: Never Used   Substance and Sexual Activity    Alcohol use: Yes    Drug use: No    Sexual activity: Yes     Partners: Female     Birth control/protection: Surgical, Male Surgical     Comment: vas   Other Topics Concern    Parent/sibling w/ CABG, MI or angioplasty before 65F 55M? Not Asked     Service Yes    Blood Transfusions No    Caffeine Concern No    Occupational Exposure No    Hobby Hazards No    Sleep Concern No    Stress Concern  No    Weight Concern No    Special Diet No    Back Care No     Comment: Herniated discs  L5  and ?    Exercise Yes     Comment: Mod has small children    Bike Helmet Not Asked    Seat Belt Yes    Self-Exams Yes   Social History Narrative    Not on file     Social Determinants of Health     Financial Resource Strain: Not on file   Food Insecurity: Not on file   Transportation Needs: Not on file   Physical Activity: Not on file   Stress: Not on file   Social Connections: Not on file   Interpersonal Safety: Not on file   Housing Stability: Not on file       Outpatient Encounter Medications as of 5/17/2024   Medication Sig Dispense Refill    albuterol (2.5 MG/3ML) 0.083% nebulizer solution 1 vial (2.5 mg) every 6 hours as needed for shortness of breath / dyspnea (Patient not taking: Reported on 5/2/2023) 30 vial 0    albuterol (PROAIR HFA) 108 (90 Base) MCG/ACT inhaler Inhale 2 puffs into the lungs every 4 hours as needed for shortness of breath / dyspnea or wheezing (Patient not taking: Reported on 5/2/2023) 1 Inhaler 11     No facility-administered encounter medications on file as of 5/17/2024.             O:   NAD, WDWN, Alert & Oriented, Mood & Affect wnl, Vitals stable   Here today alone   There were no vitals taken for this visit.   General appearance normal   Vitals stable   Alert, oriented and in no acute distress         0.5 cm brown irregular macule on right flank   0.4 cm skin colored papule on right posterior neck   0.8 cm skin colored nodule on mid frontal scalp   0.4 cm pink nodule on mid low back     Eyes: Conjunctivae/lids:Normal     ENT: Lips normal    MSK:Normal    Pulm: Breathing Normal    Neuro/Psych: Orientation:Alert and Orientedx3 ; Mood/Affect:normal     A/P:  R/O pilar cyst on mid frontal scalp   6 mm punch BIOPSY SENT OUT:  After consent, anesthesia with LEC and prep, punch excision performed, closed with 4-0 Ethilon, 3 simple interrupted sutures used to close wound and specimen sent out for  permanent section histology.  No complications and routine wound care. Patient told to call our office in 1-2 weeks for result.      2. R/O epidermal cyst on mid low back  5 mm punch BIOPSY SENT OUT:  After consent, anesthesia with LEC and prep, punch excision performed, closed with 4-0 ehtilon, 2 simple interrupted sutures used to close wound and specimen sent out for permanent section histology.  No complications and routine wound care. Patient told to call our office in 1-2 weeks for result.      3. R/O atypical nevus on right flank  TANGENTIAL BIOPSY SENT OUT:  After consent, anesthesia with LEC and prep, tangential excision performed and specimen sent out for permanent section histology.  No complications and routine wound care. Patient told to call our office in 1-2 weeks for result.      4. R/O intradermal nevus on right posterior neck  TANGENTIAL BIOPSY SENT OUT:  After consent, anesthesia with LEC and prep, tangential excision performed and specimen sent out for permanent section histology.  No complications and routine wound care. Patient told to call our office in 1-2 weeks for result.

## 2024-05-22 LAB
PATH REPORT.COMMENTS IMP SPEC: NORMAL
PATH REPORT.FINAL DX SPEC: NORMAL
PATH REPORT.GROSS SPEC: NORMAL
PATH REPORT.MICROSCOPIC SPEC OTHER STN: NORMAL
PATH REPORT.RELEVANT HX SPEC: NORMAL

## 2024-05-23 ENCOUNTER — TELEPHONE (OUTPATIENT)
Dept: DERMATOLOGY | Facility: CLINIC | Age: 44
End: 2024-05-23
Payer: COMMERCIAL

## 2024-05-23 NOTE — TELEPHONE ENCOUNTER
Patient Contact    Attempt # 1    Was call answered?  Yes    Called patient. Results were given. Patient denied having any questions. Patient was also scheduled for the 1 year skin check as well.     Betty Gandara LPN   [facility]   [phone]

## 2024-05-23 NOTE — TELEPHONE ENCOUNTER
----- Message from Joya Cuellar PA-C sent at 5/22/2024  4:01 PM CDT -----  Hi Temo,  Your mid frontal scalp and mid low back returned as pilar cysts, normal cyst.  Your right flank returned as a compound dysplastic nevus with moderate atypia, moderately atypical nevus. This appears excised on biopsy, recommend yearly skin checks.   Your right posterior neck returned as an intradermal melanocytic nevus, normal mole.   Sincerely,  Joya Cuellar PA-C

## 2025-01-01 ENCOUNTER — PATIENT OUTREACH (OUTPATIENT)
Dept: CARE COORDINATION | Facility: CLINIC | Age: 45
End: 2025-01-01
Payer: COMMERCIAL

## 2025-07-30 ENCOUNTER — OFFICE VISIT (OUTPATIENT)
Dept: URGENT CARE | Facility: URGENT CARE | Age: 45
End: 2025-07-30
Payer: COMMERCIAL

## 2025-07-30 VITALS
SYSTOLIC BLOOD PRESSURE: 120 MMHG | TEMPERATURE: 98.1 F | BODY MASS INDEX: 25.9 KG/M2 | RESPIRATION RATE: 14 BRPM | WEIGHT: 185 LBS | DIASTOLIC BLOOD PRESSURE: 77 MMHG | HEIGHT: 71 IN | HEART RATE: 58 BPM | OXYGEN SATURATION: 97 %

## 2025-07-30 DIAGNOSIS — H10.022 PINK EYE DISEASE OF LEFT EYE: Primary | ICD-10-CM

## 2025-07-30 PROCEDURE — 3074F SYST BP LT 130 MM HG: CPT | Performed by: PHYSICIAN ASSISTANT

## 2025-07-30 PROCEDURE — 3078F DIAST BP <80 MM HG: CPT | Performed by: PHYSICIAN ASSISTANT

## 2025-07-30 PROCEDURE — 99213 OFFICE O/P EST LOW 20 MIN: CPT | Performed by: PHYSICIAN ASSISTANT

## 2025-07-30 RX ORDER — OMEGA-3 FATTY ACIDS/FISH OIL 300-1000MG
600 CAPSULE ORAL EVERY 6 HOURS PRN
COMMUNITY

## 2025-07-30 RX ORDER — POLYMYXIN B SULFATE AND TRIMETHOPRIM 1; 10000 MG/ML; [USP'U]/ML
1 SOLUTION OPHTHALMIC EVERY 6 HOURS
Qty: 10 ML | Refills: 0 | Status: SHIPPED | OUTPATIENT
Start: 2025-07-30 | End: 2025-08-06

## 2025-07-30 ASSESSMENT — ENCOUNTER SYMPTOMS
COUGH: 0
GASTROINTESTINAL NEGATIVE: 1
CHEST TIGHTNESS: 0
SHORTNESS OF BREATH: 0
CARDIOVASCULAR NEGATIVE: 1
MYALGIAS: 0
FREQUENCY: 0
DIARRHEA: 0
ABDOMINAL PAIN: 0
NAUSEA: 0
CONSTITUTIONAL NEGATIVE: 1
HEADACHES: 0
WHEEZING: 0
MUSCULOSKELETAL NEGATIVE: 1
ALLERGIC/IMMUNOLOGIC NEGATIVE: 1
RESPIRATORY NEGATIVE: 1
PALPITATIONS: 0
DYSURIA: 0
SORE THROAT: 0
CHILLS: 0
NEUROLOGICAL NEGATIVE: 1
VOMITING: 0
HEMATURIA: 0
FEVER: 0
EYE REDNESS: 1

## 2025-07-30 NOTE — PROGRESS NOTES
Chief Complaint:      Chief Complaint   Patient presents with    Conjunctivitis     Pt's left eye is pink and crusty, itchy and red x 3 days.  Pt used anti-itch eye drops this morning.  Pt has cold symptoms with the eye problem.     Medical Decision Making:    Differential Diagnosis:  conjunctivitis     ASSESSMENT     1. Pink eye disease of left eye       PLAN    Rx for Polytrim drops printed out.  Patient will fill this if he develops discharge and symptoms worsen.    Artifical tears for irritation  Warm compresses with baby shampoo for mattering.   If symptoms are not improving follow up with your eye doctor in 2-3 days.  See your eye doctor immediately if symptoms worsen.  Worrisome symptoms discussed with instructions to go to the ED.  Patient verbalized understanding and agreed with this plan.    Labs:    No results found for any visits on 07/30/25.       Current Meds    Current Outpatient Medications:     polymixin b-trimethoprim (POLYTRIM) 23225-2.1 UNIT/ML-% ophthalmic solution, Place 1 drop Into the left eye every 6 hours for 7 days., Disp: 10 mL, Rfl: 0    albuterol (2.5 MG/3ML) 0.083% nebulizer solution, 1 vial (2.5 mg) every 6 hours as needed for shortness of breath / dyspnea (Patient not taking: Reported on 7/30/2025), Disp: 30 vial, Rfl: 0    albuterol (PROAIR HFA) 108 (90 Base) MCG/ACT inhaler, Inhale 2 puffs into the lungs every 4 hours as needed for shortness of breath / dyspnea or wheezing (Patient not taking: Reported on 7/30/2025), Disp: 1 Inhaler, Rfl: 11    ibuprofen (ADVIL/MOTRIN) 200 MG capsule, Take 600 mg by mouth every 6 hours as needed. (Patient not taking: Reported on 7/30/2025), Disp: , Rfl:     Allergies  Allergies   Allergen Reactions    Nuts Swelling     Lips and tongue swelling, No throat swelling yet.    Penicillins Nausea         SUBJECTIVE    HPI: Temo Sawant is a 45 year old male presenting with left eye mattering, redness, and itching  for the past 3 days.  There has not  been exposure to pink eye.  There has not been trauma to the eye.    Temo Sawant does not wear contact lenses.    No problems with vision, or eye pain.     He has had runny nose and cough recently.      ROS:     Review of Systems   Constitutional: Negative.  Negative for chills and fever.   HENT: Negative.  Negative for sore throat.    Eyes:  Positive for redness.   Respiratory: Negative.  Negative for cough, chest tightness, shortness of breath and wheezing.    Cardiovascular: Negative.  Negative for chest pain and palpitations.   Gastrointestinal: Negative.  Negative for abdominal pain, diarrhea, nausea and vomiting.   Genitourinary:  Negative for dysuria, frequency, hematuria and urgency.   Musculoskeletal: Negative.  Negative for myalgias.   Skin:  Negative for rash.   Allergic/Immunologic: Negative.  Negative for immunocompromised state.   Neurological: Negative.  Negative for headaches.           Family History   Family History   Problem Relation Age of Onset    Asthma Brother     Asthma Brother     Diabetes Mother     Diabetes Father     Cerebrovascular Disease Father     Vascular Disease Father     Allergies Son     Asthma Son     Allergies Daughter     Coronary Artery Disease No family hx of     Prostate Cancer No family hx of     Colon Cancer No family hx of         Problem history  Patient Active Problem List   Diagnosis    CARDIOVASCULAR SCREENING; LDL GOAL LESS THAN 160    Intermittent asthma           Social History  Social History     Socioeconomic History    Marital status:      Spouse name: Not on file    Number of children: Not on file    Years of education: Not on file    Highest education level: Not on file   Occupational History    Not on file   Tobacco Use    Smoking status: Former     Current packs/day: 0.00     Average packs/day: 1 pack/day for 2.0 years (2.0 ttl pk-yrs)     Types: Cigarettes     Start date: 1998     Quit date: 2000     Years since quittin.5     Passive  "exposure: Past    Smokeless tobacco: Former   Vaping Use    Vaping status: Never Used   Substance and Sexual Activity    Alcohol use: Yes    Drug use: No    Sexual activity: Yes     Partners: Female     Birth control/protection: Surgical, Male Surgical     Comment: vas   Other Topics Concern    Parent/sibling w/ CABG, MI or angioplasty before 65F 55M? Not Asked     Service Yes    Blood Transfusions No    Caffeine Concern No    Occupational Exposure No    Hobby Hazards No    Sleep Concern No    Stress Concern No    Weight Concern No    Special Diet No    Back Care No     Comment: Herniated discs  L5  and ?    Exercise Yes     Comment: Mod has small children    Bike Helmet Not Asked    Seat Belt Yes    Self-Exams Yes   Social History Narrative    Not on file     Social Drivers of Health     Financial Resource Strain: Not on file   Food Insecurity: Not on file   Transportation Needs: Not on file   Physical Activity: Not on file   Stress: Not on file   Social Connections: Not on file   Interpersonal Safety: Not on file   Housing Stability: Not on file        OBJECTIVE     Vital signs reviewed by Titus Giles PA-C  /77   Pulse 58   Temp 98.1  F (36.7  C) (Tympanic)   Resp 14   Ht 1.791 m (5' 10.5\")   Wt 83.9 kg (185 lb)   SpO2 97%   BMI 26.17 kg/m       Physical Exam  Vitals reviewed.   Constitutional:       General: He is not in acute distress.     Appearance: He is well-developed. He is not ill-appearing, toxic-appearing or diaphoretic.   HENT:      Head: Normocephalic and atraumatic.      Right Ear: Hearing, tympanic membrane, ear canal and external ear normal. No drainage, swelling or tenderness. Tympanic membrane is not perforated, erythematous, retracted or bulging.      Left Ear: Hearing, tympanic membrane, ear canal and external ear normal. No drainage, swelling or tenderness. Tympanic membrane is not perforated, erythematous, retracted or bulging.      Nose: No nasal tenderness, mucosal " edema, congestion or rhinorrhea.      Right Turbinates: Not enlarged or swollen.      Left Turbinates: Not enlarged or swollen.      Right Sinus: No maxillary sinus tenderness or frontal sinus tenderness.      Left Sinus: No maxillary sinus tenderness or frontal sinus tenderness.      Mouth/Throat:      Pharynx: No pharyngeal swelling, oropharyngeal exudate, posterior oropharyngeal erythema or uvula swelling.      Tonsils: No tonsillar exudate. 0 on the right. 0 on the left.   Eyes:      General: Lids are normal.         Right eye: No discharge.         Left eye: No discharge.      Conjunctiva/sclera: Conjunctivae normal.      Right eye: Right conjunctiva is not injected. No exudate.     Left eye: Left conjunctiva is not injected. No exudate.     Pupils: Pupils are equal, round, and reactive to light.   Cardiovascular:      Rate and Rhythm: Normal rate and regular rhythm.      Heart sounds: Normal heart sounds. No murmur heard.     No friction rub. No gallop.   Pulmonary:      Effort: Pulmonary effort is normal. No accessory muscle usage, respiratory distress or retractions.      Breath sounds: Normal breath sounds and air entry. No stridor, decreased air movement or transmitted upper airway sounds. No decreased breath sounds, wheezing, rhonchi or rales.   Chest:      Chest wall: No tenderness.   Abdominal:      General: Bowel sounds are normal. There is no distension.      Palpations: Abdomen is soft. Abdomen is not rigid. There is no mass.      Tenderness: There is no abdominal tenderness. There is no guarding or rebound.   Musculoskeletal:         General: Normal range of motion.      Cervical back: Normal range of motion and neck supple.   Lymphadenopathy:      Head:      Right side of head: No submental, submandibular, tonsillar, preauricular or posterior auricular adenopathy.      Left side of head: No submental, submandibular, tonsillar, preauricular or posterior auricular adenopathy.      Cervical:       Right cervical: No superficial or posterior cervical adenopathy.     Left cervical: No superficial or posterior cervical adenopathy.   Skin:     General: Skin is warm.      Capillary Refill: Capillary refill takes less than 2 seconds.   Neurological:      Mental Status: He is alert and oriented to person, place, and time.      Cranial Nerves: No cranial nerve deficit.      Sensory: No sensory deficit.      Motor: No abnormal muscle tone.      Coordination: Coordination normal.      Deep Tendon Reflexes: Reflexes normal.   Psychiatric:         Behavior: Behavior normal. Behavior is cooperative.         Thought Content: Thought content normal.         Judgment: Judgment normal.            Titus Giles PA-C  7/30/2025, 11:20 AM

## 2025-07-30 NOTE — PROGRESS NOTES
Urgent Care Clinic Visit    Chief Complaint   Patient presents with    Conjunctivitis     Pt's left eye is pink and crusty, itchy and red x 3 days.  Pt used anti-itch eye drops this morning.  Pt has cold symptoms with the eye problem.               7/30/2025    11:48 AM   Additional Questions   Roomed by Lainey NEWTON         7/30/2025   Forms   Any forms needing to be completed Yes